# Patient Record
Sex: MALE | Race: WHITE
[De-identification: names, ages, dates, MRNs, and addresses within clinical notes are randomized per-mention and may not be internally consistent; named-entity substitution may affect disease eponyms.]

---

## 2020-08-05 ENCOUNTER — HOSPITAL ENCOUNTER (EMERGENCY)
Dept: HOSPITAL 61 - ER | Age: 25
Discharge: HOME | End: 2020-08-05
Payer: SELF-PAY

## 2020-08-05 VITALS — BODY MASS INDEX: 25.73 KG/M2 | WEIGHT: 169.76 LBS | HEIGHT: 68 IN

## 2020-08-05 VITALS — DIASTOLIC BLOOD PRESSURE: 99 MMHG | SYSTOLIC BLOOD PRESSURE: 149 MMHG

## 2020-08-05 DIAGNOSIS — K04.7: Primary | ICD-10-CM

## 2020-08-05 DIAGNOSIS — K08.89: ICD-10-CM

## 2020-08-05 DIAGNOSIS — R60.0: ICD-10-CM

## 2020-08-05 PROCEDURE — 99283 EMERGENCY DEPT VISIT LOW MDM: CPT

## 2020-08-05 NOTE — PHYS DOC
Past Medical History


Past Medical History:  No Pertinent History


Past Surgical History:  No Surgical History


Smoking Status:  Never Smoker


Alcohol Use:  None





General Adult


EDM:


Chief Complaint:  DENTAL PROBLEM





HPI:


HPI:





Patient is a 24  year old male who presents with left upper facial swelling and 

dental pain x 1 day.  Patient lives in [place] and he just came up today 

visiting.  He states he does have a dentist back at home.  He denies fevers, 

nausea, vomiting, diarrhea, abdominal pain, chest pain, shortness of breath, 

cough.  Patient has missing teeth in the right upper back teeth and also many 

dental caries.  The gumline is swollen and tender.  Patient rates his pain a 10 

out of 10 and states it is aching and throbbing.  There is no radiation.





Review of Systems:


Review of Systems:


Constitutional:   Denies fever or chills. []


Eyes:   Denies change in visual acuity. []


HENT:   Denies nasal congestion or sore throat. Left upper dental pain with 

facial swelling[] 


Respiratory:   Denies cough or shortness of breath. [] 


Cardiovascular:   Denies chest pain or edema. [] 


GI:   Denies abdominal pain, nausea, vomiting, bloody stools or diarrhea. [] 


:  Denies dysuria. [] 


Musculoskeletal:   Denies back pain or joint pain. [] 


Integument:   Denies rash. Left upper facial swelling[] 


Neurologic:   Denies headache, focal weakness or sensory changes. [] 


Endocrine:   Denies polyuria or polydipsia. [] 


Lymphatic:  Denies swollen glands. [] 


Psychiatric:  Denies depression or anxiety. []





Heart Score:


Risk Factors:


Risk Factors:  DM, Current or recent (<one month) smoker, HTN, HLP, family 

history of CAD, obesity.


Risk Scores:


Score 0 - 3:  2.5% MACE over next 6 weeks - Discharge Home


Score 4 - 6:  20.3% MACE over next 6 weeks - Admit for Clinical Observation


Score 7 - 10:  72.7% MACE over next 6 weeks - Early Invasive Strategies





Physical Exam:


PE:





Constitutional: Well developed, well nourished, no acute distress, non-toxic 

appearance. []


HENT: Normocephalic, atraumatic, bilateral external ears normal, oropharynx 

moist, no oral exudates, nose normal. Dental caries, left upper gumline swelling

 and tenderness.[]


Eyes: PERRLA, EOMI, conjunctiva normal, no discharge. [] 


Neck: Normal range of motion, no tenderness, supple, no stridor. [] 


Cardiovascular:Heart rate regular rhythm, no murmur []


Lungs & Thorax:  Bilateral breath sounds clear to auscultation []


Abdomen: Bowel sounds normal, soft, no tenderness, no masses, no pulsatile 

masses. [] 


Skin: Warm, dry, no erythema, no rash. [] 


Back: No tenderness, no CVA tenderness. [] 


Extremities: No tenderness, no cyanosis, no clubbing, ROM intact, no edema. [] 


Neurologic: Alert and oriented X 3, normal motor function, normal sensory 

function, no focal deficits noted. []


Psychologic: Affect normal, judgement normal, mood normal. []





Current Patient Data:


Vital Signs:





                                   Vital Signs








  Date Time  Temp Pulse Resp B/P (MAP) Pulse Ox O2 Delivery O2 Flow Rate FiO2


 


8/5/20 21:19 98.6 98 16 149/99 (116) 100   





 98.6       











EKG:


EKG:


[]





Radiology/Procedures:


Radiology/Procedures:


[]





Course & Med Decision Making:


Course & Med Decision Making


Pertinent Labs and Imaging studies reviewed. (See chart for details)





See HPI.  Afebrile.  Vital signs are within normal limits.  Ambulatory with a 

steady gait.  Skin pink warm and dry.  Alert and oriented x4.  No trismus.  

Patient will be given antibiotic and pain medication.  He states he will follow-

up with his dentist soon as possible.





[]





Dragon Disclaimer:


Dragon Disclaimer:


This electronic medical record was generated, in whole or in part, using a voice

 recognition dictation system.





Departure


Departure


Impression:  


   Primary Impression:  


   Abscess, dental


Disposition:  01 HOME, SELF-CARE


Condition:  STABLE


Referrals:  


NON,STAFF (PCP)


Patient Instructions:  Dental Abscess





Additional Instructions:  


Take medication as prescribed and with food.  Remember that pain medications 

will make you sleepy and do not drink any alcohol and talk with him.  Follow-up 

with a dentist as soon as possible.


Scripts


Penicillin V Potassium (PENICILLIN V POTASSIUM) 500 Mg Tablet


1 TAB PO QID, #40 TAB


   Prov: JUAN FRANCISCO MACHADO APRN         8/5/20 


Hydrocodone/Apap 5-325 (NORCO 5-325 TABLET) 1 Each Tablet


1 TAB PO PRN Q6HRS PRN for PAIN, #12 TAB 0 Refills


   Prov: JUAN FRANCISCO MACHADO         8/5/20





Justicifation of Admission Dx:


Justifications for Admission:


Justification of Admission Dx:  N/A











JUAN FRANCISCO MACHADO             Aug 5, 2020 21:33